# Patient Record
(demographics unavailable — no encounter records)

---

## 2017-03-07 NOTE — PD
HPI


Chief Complaint:   Complaint


Time Seen by Provider:  15:52


Travel History


International Travel<30 days:  No


Contact w/Intl Traveler<30days:  No


Traveled to known affect area:  No





History of Present Illness


HPI


The patient is a 14 years old female brought in by her grandmother with 

complaint of urinary tract symptoms.  She claimed burning upon urination for a 

month, urinary frequency without hematuria with occasional back pain or lower 

abdominal pain without nausea or vomiting.  She claimed that her partner wears 

condoms.  Denies multiple partners. Denies fever or chills.  Denies 

intermittent breakthrough bleeding from vagina or foul-smelling drainage.  No 

prior pelvic examination.  Last menstrual period 2 weeks ago.





History


Past Medical History


*** Narrative Medical


ADHD.  She decided "to stop taking medications for it".


Fractures of radius October 2010


Immunizations Current:  Yes


Developmental Delay:  No





Past Surgical History


Surgical History:  No Previous Surgery





Family History


Family History:  Negative





Social History


Alcohol Use:  No


Tobacco Use:  No





Allergies-Medications


(Allergen,Severity, Reaction):  


Coded Allergies:  


     No Known Allergies (Verified , 3/7/17)


Reported Meds & Prescriptions





Reported Meds & Active Scripts


Active


Reported


Vyvanse (Lisdexamfetamine Dimesylate) 30 Mg Cap 30 Mg PO DAILY








ROS


Except as stated in HPI:  all other systems reviewed are Neg





Physical Exam


Narrative


GENERAL APPEARANCE: The patient is a well-developed, well-nourished, child in 

no acute distress.  


SKIN: Skin is warm and dry without erythema, swelling or exudate. There is good 

turgor. No tenting.


HEENT: Throat is clear without erythema, swelling or exudate. Mucous membranes 

are moist. Uvula is midline. Airway is patent. The pupils are equal, round and 

reactive to light. Extraocular motions are intact. No drainage or injection. 

The ears show bilateral tympanic membranes without erythema, dullness or loss 

of landmarks. No perforation.


NECK: Supple and nontender with full range of motion without discomfort. No 

meningeal signs.


LUNGS: Equal and bilateral breath sounds without wheezes, rales or rhonchi.


CHEST: The chest wall is without retractions or use of accessory muscles.


HEART: Has a regular rate and rhythm without murmur, gallops, click or rub.


ABDOMEN: Soft, nontender with positive active bowel sounds. No rebound 

tenderness. No masses, no hepatosplenomegaly.


EXTREMITIES: Without cyanosis, clubbing or edema. Equal 2+ distal pulses and 2 

second capillary refill noted.


NEUROLOGIC: The patient is alert, aware, and appropriately interactive with 

parent and with examiner. The patient moves all extremities with normal muscle 

strength. Normal muscle tone is noted. Normal coordination is noted.





Data


Data


Last Documented VS





Vital Signs








  Date Time  Temp Pulse Resp B/P Pulse Ox O2 Delivery O2 Flow Rate FiO2


 


3/7/17 19:05  80 20     


 


3/7/17 14:50 98.1   130/74 95   








Orders





 Urinalysis - C+S If Indicated (3/7/17 16:02)


Gc And Chlamydia Pcr (3/7/17 16:02)


Ed Urine Pregnancytest Poc (3/7/17 16:02)


Azithromycin (Zithromax) (3/7/17 18:15)


Ceftriaxone Inj (Rocephin Inj) (3/7/17 18:15)


Lidocaine 1% Inj (50 Ml) (Xylocaine 1% I (3/7/17 18:15)





Labs





 Laboratory Tests








Test 3/7/17





 16:05


 


Urine Color YELLOW 


 


Urine Turbidity CLEAR 


 


Urine pH 6.0 


 


Urine Specific Gravity 1.018 


 


Urine Protein NEG mg/dL


 


Urine Glucose (UA) NEG mg/dL


 


Urine Ketones NEG mg/dL


 


Urine Occult Blood NEG 


 


Urine Nitrite NEG 


 


Urine Bilirubin NEG 


 


Urine Urobilinogen LESS THAN 2.0





 MG/DL


 


Urine Leukocyte Esterase TRACE 


 


Urine WBC 1 /hpf


 


Urine Squamous Epithelial 3 /hpf





Cells 


 


Urine Mucus FEW /lpf


 


Microscopic Urinalysis Comment CULT NOT





 INDICATED


 


Chlamydia trachomatis DNA NOT DETECTED 





(PCR) 


 


Neisseria gonorrhoeae DNA NOT DETECTED 





(PCR) 











[facility]


Medical Decision Making


Medical Screen Exam Complete:  Yes


Emergency Medical Condition:  Yes


Medical Record Reviewed:  Yes


Interpretation(s)


UA is negative.


Differential Diagnosis


Acute cystitis, pyelonephritis, urinary tract infection, vulvovaginitis, 

pregnancy, STDs, pelvic inflammatory disease


Narrative Course


Medical decision-making: low complexity.  Diagnosis: UTI.  Rule out STDs/

pregnancy.


The patient was signed out to Dr. Burns for continuity of care and disposition.


Condition:  Stable








Khushboo Logan MD Mar 7, 2017 16:11

## 2017-03-07 NOTE — PD
Data


Data


Last Documented VS





Vital Signs








  Date Time  Temp Pulse Resp B/P Pulse Ox O2 Delivery O2 Flow Rate FiO2


 


3/7/17 14:50 98.1 110 15 130/74 95   








Orders





 Urinalysis - C+S If Indicated (3/7/17 16:02)


Gc And Chlamydia Pcr (3/7/17 16:02)


Ed Urine Pregnancytest Poc (3/7/17 16:02)


Azithromycin (Zithromax) (3/7/17 18:15)


Ceftriaxone Inj (Rocephin Inj) (3/7/17 18:15)


Lidocaine 1% Inj (50 Ml) (Xylocaine 1% I (3/7/17 18:15)





Labs





 Laboratory Tests








Test 3/7/17





 16:05


 


Urine Color YELLOW 


 


Urine Turbidity CLEAR 


 


Urine pH 6.0 


 


Urine Specific Gravity 1.018 


 


Urine Protein NEG mg/dL


 


Urine Glucose (UA) NEG mg/dL


 


Urine Ketones NEG mg/dL


 


Urine Occult Blood NEG 


 


Urine Nitrite NEG 


 


Urine Bilirubin NEG 


 


Urine Urobilinogen LESS THAN 2.0





 MG/DL


 


Urine Leukocyte Esterase TRACE 


 


Urine WBC 1 /hpf


 


Urine Squamous Epithelial 3 /hpf





Cells 


 


Urine Mucus FEW /lpf


 


Microscopic Urinalysis Comment CULT NOT





 INDICATED











MDM


Medical Record Reviewed:  Yes


Supervised Visit with ARGENIS:  No


Differential Diagnosis


GC chlamydia


Bacterial vaginosis


Candidal yeast infection


Narrative Course


Care was assumed from Dr. Logan.  Grandmother and child did not want to wait 

for results of GC and Chlamydia test.  It was decided to empirically treat the 

child and we will get in touch with her if the GC or chlamydia test was 

positive.  I spoke with them at length about ways to avoid STDs and avoid 

pregnancy.  The grandmother voiced understanding.


Diagnosis





 Primary Impression:  


 Vaginitis


 Qualified Code:  N76.0 - Acute vaginitis


Patient Instructions:  General Instructions, Vaginitis (ED)





***Additional Instruction:


If The GC and chlamydia test are positive we will get in touch with the 

grandmother.


***Med/Other Pt SpecificInfo:  No Meds Exist/No RX given


Disposition:  01 DISCHARGE HOME


Condition:  Good








Traci Burns MD Mar 7, 2017 18:46

## 2017-12-05 NOTE — PD
HPI


Chief Complaint:  Lump, Cyst, Hernia


Time Seen by Provider:  15:12


Travel History


International Travel<30 days:  No


Contact w/Intl Traveler<30days:  No


Traveled to known affect area:  No





History of Present Illness


HPI


The patient is a 14 years old female brought in by her grandmother complaining 

of a lump behind the left ear which is tender on touching it and swollen.  

Apparently she woke up this morning with this complain and at times itches.  

The symptoms started yesterday both worsened today as above.  No drainage.  No 

cat at home.





History


Past Medical History


*** Narrative Medical


Vaginitis in March of this year.


Immunizations Current:  Yes


Developmental Delay:  No





Past Surgical History


Surgical History:  No Previous Surgery





Family History


Family History:  Negative





Social History


Alcohol Use:  No


Tobacco Use:  No





Allergies-Medications


(Allergen,Severity, Reaction):  


Coded Allergies:  


     No Known Allergies (Verified  Adverse Reaction, Unknown, 12/5/17)


Reported Meds & Prescriptions





Reported Meds & Active Scripts


Active


No Active Prescriptions or Reported Medications    








ROS


Except as stated in HPI:  all other systems reviewed are Neg





Physical Exam


Narrative


GENERAL APPEARANCE: The patient is a well-developed, well-nourished, child in 

no acute distress.  


SKIN: Focused skin assessment warm/dry without erythema, swelling or exudate. 

There is good turgor. No tenting.


HEENT: Throat is clear without erythema, swelling or exudate. Mucous membranes 

are moist. Uvula is midline. Airway is  


patent. The pupils are equal, round and reactive to light. Extraocular motions 

are intact. No drainage or injection. The  


ears show bilateral tympanic membranes without erythema, dullness or loss of 

landmarks. No perforation.


NECK: Supple and nontender with full range of motion without discomfort. No 

meningeal signs.  With a 1.5-2 cm swollen tender lymph node on retro-mastoid 

area with some irritation/erythema at the base of the left external ear with #2 

tiny punctum  by 2.5-3 mm without foreign body on it.  No drainage.


LUNGS: Equal and bilateral breath sounds without wheezes, rales or rhonchi.


CHEST: The chest wall is without retractions or use of accessory muscles.


HEART: Has a regular rate and rhythm without murmur, gallops, click or rub.


ABDOMEN: Soft, nontender with positive active bowel sounds. No rebound 

tenderness. No masses, no hepatosplenomegaly.


EXTREMITIES: Without cyanosis, clubbing or edema. Equal 2+ distal pulses and 2 

second capillary refill noted.


NEUROLOGIC: The patient is alert, aware, and appropriately interactive with 

parent and with examiner. The patient moves all  


extremities with normal muscle strength. Normal muscle tone is noted. Normal 

coordination is noted.





Data


Data


Last Documented VS





Vital Signs








  Date Time  Temp Pulse Resp B/P (MAP) Pulse Ox O2 Delivery O2 Flow Rate FiO2


 


12/5/17 15:02 99.3 86 24 125/59 (81) 95 Room Air  











MDM


Medical Decision Making


Medical Screen Exam Complete:  Yes


Emergency Medical Condition:  Yes


Medical Record Reviewed:  Yes


Differential Diagnosis


Lymphadenitis, cellulitis, lymphangitis, insect bite, spider bite, cat scratch 

disease.


Narrative Course


Medical decision-making: Low complexity.  Diagnosis: Insect bite of unknown 

etiology.  Reactive retromastoid adenitis.


Expanded diagnosis to the patient and grandmother.


Advised warm compresses 4 times a day for 4872 hours.


Rx cephalexin 500 mg 3 times a day for 10 days.


Follow by her PCP in 2 weeks.





Diagnosis





 Primary Impression:  


 Adenitis, acute


 Additional Impression:  


 Insect bite


 Qualified Codes:  W57.XXXA - Bitten or stung by nonvenomous insect and other 

nonvenomous arthropods, initial encounter


Patient Instructions:  Adenitis (ED), General Instructions, Insect Bite or 

Sting (ED)





***Additional Instructions:  


May return to ED if symptoms worsen: Cellulitis, lymphangitis, fever, chills.


Warm compresses 4 times a day for 3 days.


Ibuprofen or Tylenol for pain as needed.


***Med/Other Pt SpecificInfo:  Prescription(s) given


Scripts


Cephalexin (Cephalexin) 500 Mg Cap


500 MG PO Q8H for Infection for 10 Days, #30 CAP 0 Refills


   Prov: Khushboo Logan MD         12/5/17


Disposition:  01 DISCHARGE HOME


Condition:  Stable





__________________________________________________


Primary Care Physician


Unknown











Khushboo Logan MD Dec 5, 2017 15:32